# Patient Record
Sex: FEMALE | Race: WHITE
[De-identification: names, ages, dates, MRNs, and addresses within clinical notes are randomized per-mention and may not be internally consistent; named-entity substitution may affect disease eponyms.]

---

## 2018-03-22 ENCOUNTER — HOSPITAL ENCOUNTER (OUTPATIENT)
Dept: HOSPITAL 59 - SUR | Age: 48
Discharge: HOME | End: 2018-03-22
Attending: ORTHOPAEDIC SURGERY
Payer: COMMERCIAL

## 2018-03-22 DIAGNOSIS — M94.262: Primary | ICD-10-CM

## 2018-03-22 DIAGNOSIS — M22.42: ICD-10-CM

## 2018-03-22 DIAGNOSIS — M65.862: ICD-10-CM

## 2018-03-22 PROCEDURE — 81025 URINE PREGNANCY TEST: CPT

## 2018-03-22 PROCEDURE — 29881 ARTHRS KNE SRG MNISECTMY M/L: CPT

## 2018-03-22 PROCEDURE — 01400 ANES OPN/ARTHRS KNEE JT NOS: CPT

## 2018-03-22 PROCEDURE — 29876 ARTHRS KNEE SURG SYNVCT MAJ: CPT

## 2018-03-23 NOTE — OPERATIVE NOTE
DATE OF SURGERY: 03/22/2018



Surgeon: Christoph Montemayor DO



PREOPERATIVE DIAGNOSES: 

1. Torn lateral meniscus of the left knee. 

2. Chondromalacia of the left knee. 



POSTOPERATIVE DIAGNOSES:

1. Torn lateral meniscus, left knee. 

2. Chondromalacia of the medial femoral condyle, lateral femoral condyle, and patella, left knee and 
the trochlea, left knee. 

3. Synovitis, left knee (2 compartments). 



OPERATION: 

1. Arthroscopic partial lateral meniscectomy, left knee. 

2. Arthroscopic partial synovectomy, left knee (2 compartments). 

3. Arthroscopic chondroplasty of medial femoral condyle, lateral femoral condyle, patella, and 
trochlea, left knee. 



DESCRIPTION OF PROCEDURE: This 48-year-old female was taken to the operating room and placed in the 
supine position on the operating room table where general anesthesia was induced. The left lower 
extremity was elevated. It was exsanguinated and the tourniquet inflated to 300 mmHg. Arthroscopic 
knee garcia applied and the left knee prepped with Hibiclens and draped in the usual sterile 
fashion. 



An inferolateral portal was established for the 4 mm arthroscope, and initial evaluation of the 
joint demonstrated normal appearance of the suprapatellar pouch but there was grade 2 chondromalacia 
of the patellofemoral articulation with an area of approximately 1.5 to 2 cm wide down the center of 
the trochlea which was unstable and chondroplasty was performed. The entire patella was also 
involved in this condition being grade 2 throughout, and we removed unstable fragments. 



The medial compartment was entered and a relatively large area of grade 3 chondromalacia was noted 
in the medial femoral condyle with a swath of about 1.5 cm down the center longitudinal from front 
to back was noted down the center of the weightbearing surface. The edges of this were smoothed with 
the rotating shaver to stabilize the loose flaps of articular cartilage. In addition, there was 
intense synovitis present and this was debrided in the anterior, medial, and late compartments. 



The intracondylar notch was examined and found to be normal. 



The lateral compartment was entered, and a severe tear of the lateral meniscus was present with both 
flap and horizontal cleavage components. Utilizing the basket forceps, we resected back to the apex 
of the tear being at approximately the 1:30 to 2-o'clock position very near the meniscosynovial 
junction. We then tapered in each direction to restore stability to the lateral meniscus. It was 
re-probed and confirmed to be stable. Grade 2 chondromalacia noted throughout the entire 
articulating surface of the lateral tibial plateau and lateral femoral condyle. The joint was then 
copiously irrigated and suctioned with all areas being reevaluated and probed. No additional 
findings were present. This joint was suctioned. The instruments were removed. The patient taken to 
the recovery room in satisfactory condition. 



GROSS PATHOLOGY: The patient demonstrated synovitis of the knee with a macerated tear of the lateral 
meniscus and arthritic change with grade 3 changes on the medial side in the medial femoral condyle 
and grade 2 changes noted elsewhere as described. 
ADELSO